# Patient Record
Sex: MALE | Race: WHITE | NOT HISPANIC OR LATINO | Employment: FULL TIME | URBAN - NONMETROPOLITAN AREA
[De-identification: names, ages, dates, MRNs, and addresses within clinical notes are randomized per-mention and may not be internally consistent; named-entity substitution may affect disease eponyms.]

---

## 2019-08-27 ENCOUNTER — OFFICE VISIT CONVERTED (OUTPATIENT)
Dept: SURGERY | Facility: CLINIC | Age: 59
End: 2019-08-27
Attending: SURGERY

## 2019-08-29 ENCOUNTER — CONVERSION ENCOUNTER (OUTPATIENT)
Dept: OTOLARYNGOLOGY | Facility: CLINIC | Age: 59
End: 2019-08-29

## 2019-08-29 ENCOUNTER — OFFICE VISIT CONVERTED (OUTPATIENT)
Dept: OTOLARYNGOLOGY | Facility: CLINIC | Age: 59
End: 2019-08-29
Attending: OTOLARYNGOLOGY

## 2019-10-18 ENCOUNTER — HOSPITAL ENCOUNTER (OUTPATIENT)
Dept: GASTROENTEROLOGY | Facility: HOSPITAL | Age: 59
Setting detail: HOSPITAL OUTPATIENT SURGERY
Discharge: HOME | End: 2019-10-18
Attending: SURGERY

## 2021-05-15 VITALS
WEIGHT: 249 LBS | HEART RATE: 68 BPM | SYSTOLIC BLOOD PRESSURE: 140 MMHG | HEIGHT: 68 IN | DIASTOLIC BLOOD PRESSURE: 70 MMHG | OXYGEN SATURATION: 96 % | BODY MASS INDEX: 37.74 KG/M2 | RESPIRATION RATE: 18 BRPM | TEMPERATURE: 97.8 F

## 2021-05-15 VITALS — RESPIRATION RATE: 16 BRPM | WEIGHT: 247.37 LBS | BODY MASS INDEX: 37.49 KG/M2 | HEIGHT: 68 IN

## 2022-12-01 ENCOUNTER — OFFICE VISIT (OUTPATIENT)
Dept: OTOLARYNGOLOGY | Facility: CLINIC | Age: 62
End: 2022-12-01

## 2022-12-01 VITALS — WEIGHT: 260.8 LBS | HEIGHT: 68 IN | BODY MASS INDEX: 39.53 KG/M2 | TEMPERATURE: 97.7 F

## 2022-12-01 DIAGNOSIS — H69.83 DYSFUNCTION OF BOTH EUSTACHIAN TUBES: ICD-10-CM

## 2022-12-01 DIAGNOSIS — H65.93 OTHER NONSUPPURATIVE OTITIS MEDIA OF BOTH EARS, UNSPECIFIED CHRONICITY: Primary | ICD-10-CM

## 2022-12-01 DIAGNOSIS — H90.6 MIXED CONDUCTIVE AND SENSORINEURAL HEARING LOSS OF BOTH EARS: ICD-10-CM

## 2022-12-01 PROCEDURE — 99204 OFFICE O/P NEW MOD 45 MIN: CPT | Performed by: OTOLARYNGOLOGY

## 2022-12-01 RX ORDER — PANTOPRAZOLE SODIUM 40 MG/1
TABLET, DELAYED RELEASE ORAL
COMMUNITY
Start: 2022-10-02

## 2022-12-01 RX ORDER — APIXABAN 5 MG/1
TABLET, FILM COATED ORAL
COMMUNITY
Start: 2022-11-27

## 2022-12-01 RX ORDER — MELOXICAM 15 MG/1
TABLET ORAL
COMMUNITY

## 2022-12-01 RX ORDER — METOPROLOL SUCCINATE 50 MG/1
TABLET, EXTENDED RELEASE ORAL
COMMUNITY
Start: 2022-11-27

## 2022-12-02 RX ORDER — CIPROFLOXACIN AND DEXAMETHASONE 3; 1 MG/ML; MG/ML
3 SUSPENSION/ DROPS AURICULAR (OTIC) 2 TIMES DAILY
Qty: 7.5 ML | Refills: 0 | Status: SHIPPED | OUTPATIENT
Start: 2022-12-02 | End: 2022-12-12

## 2022-12-06 PROCEDURE — 69433 CREATE EARDRUM OPENING: CPT | Performed by: OTOLARYNGOLOGY

## 2022-12-06 NOTE — PROGRESS NOTES
Patient Name: Jessa Benavidez   Visit Date: 12/01/2022   Patient ID: [id number]  Provider: Amarjit Pollack MD    Sex: male  [place] [facility]   YOB: 1960  Location Address: [address] [place] [address], [address],?[place]?[address]    Primary Care Provider Carlton Rodriguez Jr., MD  Location Phone: [phone]    Referring Provider: No ref. provider found        Chief Complaint  Hearing Loss/Audio results    Subjective    History of Present Illness  Jessa Benavidez is a 62 y.o. male who presents to [facility] today as a consult from No ref. provider found.    He presents to the clinic today for evaluation of eustachian tube dysfunction, mixed hearing loss, and possible otitis media.  He informs me that he has had progressively worsening hearing, and recently had an audiogram.  I reviewed the results with him which shows borderline normal to precipitously sloping to mild to severe mixed hearing loss in both ears, slightly worse on the left side.  There is a conductive component of at least 20 dB throughout.  Tympanograms reveal flat tympanometry with decreased eardrum mobility.  Word discrimination scores are 60% on the right and 80% on the left.  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    Past Medical History:   Diagnosis Date   • A-fib ([facility])        Past Surgical History:   Procedure Laterality Date   • BACK SURGERY     • REPLACEMENT TOTAL KNEE Bilateral    • WISDOM TOOTH EXTRACTION           Current Outpatient Medications:   •  Eliquis 5 MG tablet tablet, , Disp: , Rfl:   •  metoprolol succinate XL (TOPROL-XL) 50 MG 24 hr tablet, , Disp: , Rfl:   •  pantoprazole (PROTONIX) 40 MG EC tablet, TAKE 1 TABLET BY MOUTH EVERY DAY FOR STOMACH ACID, Disp: , Rfl:   •  ciprofloxacin-dexamethasone (CIPRODEX) 0.3-0.1 % otic suspension, Administer 3 drops into ear(s) as  "directed by provider 2 (Two) Times a Day for 10 days., Disp: 7.5 mL, Rfl: 0  •  meloxicam (MOBIC) 15 MG tablet, meloxicam 15 mg oral tablet take 1 tablet (15 mg) by oral route once daily   Suspended, Disp: , Rfl:      No Known Allergies    History reviewed. No pertinent family history.     Social History     Social History Narrative   • Not on file       Objective     Vital Signs:   Temp 97.7 °F (36.5 °C) (Temporal)   Ht 172.7 cm (68\")   Wt 118 kg (260 lb 12.8 oz)   BMI 39.65 kg/m²       Physical Exam    Left Myringotomy Tube Insertion    Date/Time: 12/6/2022 1:35 PM  Performed by: Amarjit Pollack MD  Authorized by: Amarjit Pollack MD         Indications:  Left myringotomy tube insertion was felt to be indicated for otitis media with effusion and eustachian tube dysfunction.     Procedure:  The ear canal and tympanic membrane were visualized with the otologic microscope. A left myringotomy tube insertion was performed. The middle ear was inspected and noted to have edema and serous effusion. After suctioning, a beveled Avila modified tube was then placed in the myringotomy site. Afterwards, the ear was treated with Ciprodex. The procedure was tolerated with difficulty.         Constitutional   Appearance  · : well developed, well-nourished, alert and in no acute distress, voice clear and strong    Head  Inspection  · : no deformities or lesions  Face  Inspection  · : No facial lesions; House-Brackmann I/VI bilaterally  Palpation  · : No TMJ crepitus nor  muscle tenderness bilaterally    Eyes  Vision  Visual Fields  · : Extraocular movements are intact. No spontaneous or gaze-induced nystagmus.  Conjunctivae  · : clear  Sclerae  · : clear  Pupils and Irises  · : pupils equal, round, and reactive to light.     Ears, Nose, Mouth and Throat    Ears    External Ears  · : appearance within normal limits, no lesions present  Otoscopic Examination  · : Tympanic membrane appearance dull bilaterally, " possible mucoid effusions, thickened tympanic membrane.  Attempts were made to place a right ear tube, but I was unable to use due to patient discomfort and thickened eardrum.  Left ear tube was placed with some difficulty.  Hearing  · : intact to conversational voice both ears  Tunning fork testing:     :    Nose    External Nose  · : appearance normal  Intranasal Exam  · : mucosa within normal limits, vestibules normal, no intranasal lesions present, septum midline, sinuses non tender to percussion  Oral Cavity    Oral Mucosa  · : oral mucosa normal without pallor or cyanosis  Lips  · : lip appearance normal  Teeth  · : normal dentition for age  Gums  · : gums pink, non-swollen, no bleeding present  Tongue  · : tongue appearance normal; normal mobility  Palate  · : hard palate normal, soft palate appearance normal with symmetric mobility    Throat    Oropharynx  · : no inflammation or lesions present, tonsils within normal limits  Hypopharynx  · : appearance within normal limits, superior epiglottis within normal limits  Larynx  · : appearance within normal limits, vocal cords within normal limits, no lesions present    Neck  Inspection/Palpation  · : normal appearance, no masses or tenderness, trachea midline; thyroid size normal, nontender, no nodules or masses present on palpation    Respiratory  Respiratory Effort  · : breathing unlabored  Inspection of Chest  · : normal appearance, no retractions    Cardiovascular  Heart  · : regular rate and rhythm    Lymphatic  Neck  · : no lymphadenopathy present  Supraclavicular Nodes  · : no lymphadenopathy present  Preauricular Nodes  · : no lymphadenopathy present    Skin and Subcutaneous Tissue  General Inspection  · : Regarding face and neck - there are no rashes present, no lesions present, and no areas of discoloration    Neurologic  Cranial Nerves  · : cranial nerves II-XII are grossly intact bilaterally  Gait and Station  · : normal gait, able to stand without  diffculty    Psychiatric  Judgement and Insight  · : judgment and insight intact  Mood and Affect  · : mood normal, affect appropriate          Assessment and Plan    Diagnoses and all orders for this visit:    1. Other nonsuppurative otitis media of both ears, unspecified chronicity (Primary)  -     ciprofloxacin-dexamethasone (CIPRODEX) 0.3-0.1 % otic suspension; Administer 3 drops into ear(s) as directed by provider 2 (Two) Times a Day for 10 days.  Dispense: 7.5 mL; Refill: 0    2. Dysfunction of both eustachian tubes    3. Mixed conductive and sensorineural hearing loss of both ears    Examination today revealed dull appearing eardrums on both sides.  Based on the results of the audiogram and appearance of his years, I did inform him that he may have fluid in the middle ear that is contributing to his hearing loss, or perhaps thickening of the eardrum.  I did recommend PE tube placement, but we were not able to place the tube on the right side due to thickened eardrum and patient discomfort.  I was able to place a tube on the left, which did have a serous effusion and mucosal inflammation as well as thickened eardrum.  I have started him on Ciprodex which I would like for him to use for about a week, and we will plan to see him back in the clinic in about 6 weeks.    Follow Up   No follow-ups on file.  Patient was given instructions and counseling regarding his condition or for health maintenance advice. Please see specific information pulled into the AVS if appropriate.

## 2023-01-19 ENCOUNTER — OFFICE VISIT (OUTPATIENT)
Dept: OTOLARYNGOLOGY | Facility: CLINIC | Age: 63
End: 2023-01-19
Payer: COMMERCIAL

## 2023-01-19 VITALS — WEIGHT: 258.8 LBS | BODY MASS INDEX: 39.22 KG/M2 | HEIGHT: 68 IN | TEMPERATURE: 97.5 F

## 2023-01-19 DIAGNOSIS — H90.3 SENSORINEURAL HEARING LOSS (SNHL) OF BOTH EARS: ICD-10-CM

## 2023-01-19 DIAGNOSIS — H69.83 DYSFUNCTION OF BOTH EUSTACHIAN TUBES: Primary | ICD-10-CM

## 2023-01-19 PROCEDURE — 99213 OFFICE O/P EST LOW 20 MIN: CPT | Performed by: OTOLARYNGOLOGY

## 2023-01-19 NOTE — PROGRESS NOTES
Patient Name: Jessa Benavidez   Visit Date: 01/19/2023   Patient ID: [id number]  Provider: Amarjit Pollack MD    Sex: male  Location: [facility] [place] [facility]   YOB: 1960  Location Address: [address] [place] [address], [address],?[place]?[address]    Primary Care Provider Carlton Rodriguez Jr., MD  Location Phone: [phone]    Referring Provider: No ref. provider found        Chief Complaint  Follow-up (Ear check)    Subjective    History of Present Illness  Jessa Benavidez is a 62 y.o. male who presents to [facility] today as a consult from No ref. provider found.    He presents to the clinic today for follow-up regarding his ears and hearing.  In the last clinic visit I was able to place an ear tube on the left side, but not on the right due to difficulty and discomfort.  His eardrums appeared inflamed bilaterally and there was thickened eardrums and fluid in the middle ear on the left side.  He informs me that he has done well and has not had any ear pain issues.  He has noted a significant improvement in the hearing on his left side.  He previously was supposed to have a hearing test but they were not able to perform the test due to the fluid and he was sent for me to evaluate him in early December.  He does use hearing aids, and has had significant improvement after PE tube placement.    Past Medical History:   Diagnosis Date   • A-fib ([facility])    • ETD (eustachian tube dysfunction)    • HL (hearing loss)    • Otitis media        Past Surgical History:   Procedure Laterality Date   • BACK SURGERY     • REPLACEMENT TOTAL KNEE Bilateral    • WISDOM TOOTH EXTRACTION           Current Outpatient Medications:   •  Eliquis 5 MG tablet tablet, , Disp: , Rfl:   •  meloxicam (MOBIC) 15 MG tablet, meloxicam 15 mg oral tablet take 1 tablet (15 mg) by oral route once daily   Suspended, Disp: , Rfl:   •  metoprolol succinate XL (TOPROL-XL) 50 MG 24 hr tablet, , Disp: , Rfl:  "  •  pantoprazole (PROTONIX) 40 MG EC tablet, TAKE 1 TABLET BY MOUTH EVERY DAY FOR STOMACH ACID, Disp: , Rfl:      No Known Allergies    Family History   Problem Relation Age of Onset   • Heart disease Mother    • Heart disease Father    • Cancer Father    • Alzheimer's disease Father    • Cancer Sister    • Cancer Maternal Uncle    • Cancer Paternal Aunt         Social History     Social History Narrative   • Not on file       Objective     Vital Signs:   Temp 97.5 °F (36.4 °C) (Tympanic)   Ht 172.7 cm (68\")   Wt 117 kg (258 lb 12.8 oz)   BMI 39.35 kg/m²       Physical Exam         Constitutional   Appearance  · : well developed, well-nourished, alert and in no acute distress, voice clear and strong    Head  Inspection  · : no deformities or lesions  Face  Inspection  · : No facial lesions; House-Brackmann I/VI bilaterally  Palpation  · : No TMJ crepitus nor  muscle tenderness bilaterally    Eyes  Vision  Visual Fields  · : Extraocular movements are intact. No spontaneous or gaze-induced nystagmus.  Conjunctivae  · : clear  Sclerae  · : clear  Pupils and Irises  · : pupils equal, round, and reactive to light.     Ears, Nose, Mouth and Throat    Ears    External Ears  · : appearance within normal limits, no lesions present  Otoscopic Examination  · : Tympanic membrane appearance thickened on the right, well-healed tympanic membrane.  Left ear with patent functioning PE tube, improvement in the thickening of the eardrum.  Hearing  · : intact to conversational voice both ears  Tunning fork testing:     :    Nose    External Nose  · : appearance normal  Intranasal Exam  · : mucosa within normal limits, vestibules normal, no intranasal lesions present, septum midline, sinuses non tender to percussion  Oral Cavity    Oral Mucosa  · : oral mucosa normal without pallor or cyanosis  Lips  · : lip appearance normal  Teeth  · : normal dentition for age  Gums  · : gums pink, non-swollen, no bleeding " present  Tongue  · : tongue appearance normal; normal mobility  Palate  · : hard palate normal, soft palate appearance normal with symmetric mobility    Throat    Oropharynx  · : no inflammation or lesions present, tonsils within normal limits  Hypopharynx  · : appearance within normal limits, superior epiglottis within normal limits  Larynx  · : appearance within normal limits, vocal cords within normal limits, no lesions present    Neck  Inspection/Palpation  · : normal appearance, no masses or tenderness, trachea midline; thyroid size normal, nontender, no nodules or masses present on palpation    Respiratory  Respiratory Effort  · : breathing unlabored  Inspection of Chest  · : normal appearance, no retractions    Cardiovascular  Heart  · : regular rate and rhythm    Lymphatic  Neck  · : no lymphadenopathy present  Supraclavicular Nodes  · : no lymphadenopathy present  Preauricular Nodes  · : no lymphadenopathy present    Skin and Subcutaneous Tissue  General Inspection  · : Regarding face and neck - there are no rashes present, no lesions present, and no areas of discoloration    Neurologic  Cranial Nerves  · : cranial nerves II-XII are grossly intact bilaterally  Gait and Station  · : normal gait, able to stand without diffculty    Psychiatric  Judgement and Insight  · : judgment and insight intact  Mood and Affect  · : mood normal, affect appropriate          Assessment and Plan    Diagnoses and all orders for this visit:    1. Dysfunction of both eustachian tubes (Primary)  -     Comprehensive Hearing Test; Future  -     Tympanometry; Future    2. Sensorineural hearing loss (SNHL) of both ears  -     Comprehensive Hearing Test; Future  -     Tympanometry; Future    Examination today revealed significant improvement in his left eardrum which previously appeared thickened.  His PE tube is patent and functioning.  Right ear appears stable and does have thickened tympanic membrane and possible fluid in the  middle ear.  I discussed the findings with him.  I will plan on obtaining an audiogram and tympanogram and we will see him back afterwards to discuss the results and any further management.    Follow Up   No follow-ups on file.  Patient was given instructions and counseling regarding his condition or for health maintenance advice. Please see specific information pulled into the AVS if appropriate.

## 2023-03-08 ENCOUNTER — OFFICE VISIT (OUTPATIENT)
Dept: OTOLARYNGOLOGY | Facility: CLINIC | Age: 63
End: 2023-03-08
Payer: COMMERCIAL

## 2023-03-08 VITALS — BODY MASS INDEX: 39.07 KG/M2 | TEMPERATURE: 98.2 F | WEIGHT: 257.8 LBS | HEIGHT: 68 IN

## 2023-03-08 DIAGNOSIS — H69.82 DYSFUNCTION OF LEFT EUSTACHIAN TUBE: ICD-10-CM

## 2023-03-08 DIAGNOSIS — H90.3 SENSORINEURAL HEARING LOSS (SNHL) OF BOTH EARS: Primary | ICD-10-CM

## 2023-03-08 PROBLEM — H69.92 DYSFUNCTION OF LEFT EUSTACHIAN TUBE: Status: ACTIVE | Noted: 2023-03-08

## 2023-03-08 PROCEDURE — 99213 OFFICE O/P EST LOW 20 MIN: CPT | Performed by: OTOLARYNGOLOGY

## 2023-03-08 NOTE — PROGRESS NOTES
Patient Name: Jessa Benavidez   Visit Date: 03/08/2023   Patient ID: [id number]  Provider: Amarjit Pollack MD    Sex: male  [place] [facility]   YOB: 1960  Location Address: [address] [place] [address], [address],?[place]?[address]    Primary Care Provider Carlton Rodriguez Jr., MD  Location Phone: [phone]    Referring Provider: No ref. provider found        Chief Complaint  Follow-up (With Audio results)    Subjective    History of Present Illness  Jessa Benavidez is a 62 y.o. male who presents to [facility] today as a consult from No ref. provider found.    He presents to the clinic today for follow-up regarding his ears and hearing.  I last saw him for this in January, at which point his left ear was doing much better with a PE tube placed in the eardrum looked less thickened.  He did have a recent audiogram and had his hearing aids adjusted.  This shows borderline normal hearing sloping to mild to severe mixed hearing loss in the right ear and mild to profound mixed hearing loss in the left ear.  Results were discussed with the patient.  He notes that he is doing very well with hearing aids.  He has had no drainage or ear complaints.    Past Medical History:   Diagnosis Date   • A-fib ([facility])    • ETD (eustachian tube dysfunction)    • HL (hearing loss)    • Otitis media        Past Surgical History:   Procedure Laterality Date   • BACK SURGERY     • REPLACEMENT TOTAL KNEE Bilateral    • WISDOM TOOTH EXTRACTION           Current Outpatient Medications:   •  Eliquis 5 MG tablet tablet, , Disp: , Rfl:   •  meloxicam (MOBIC) 15 MG tablet, meloxicam 15 mg oral tablet take 1 tablet (15 mg) by oral route once daily   Suspended, Disp: , Rfl:   •  metoprolol succinate XL (TOPROL-XL) 50 MG 24 hr tablet, , Disp: , Rfl:   •  pantoprazole (PROTONIX) 40 MG EC tablet, TAKE 1 TABLET BY MOUTH EVERY DAY FOR STOMACH ACID, Disp: , Rfl:      No Known  "Allergies    Family History   Problem Relation Age of Onset   • Heart disease Mother    • Heart disease Father    • Cancer Father    • Alzheimer's disease Father    • Cancer Sister    • Cancer Maternal Uncle    • Cancer Paternal Aunt         Social History     Social History Narrative   • Not on file       Objective     Vital Signs:   Temp 98.2 °F (36.8 °C) (Tympanic)   Ht 172.7 cm (68\")   Wt 117 kg (257 lb 12.8 oz)   BMI 39.20 kg/m²       Physical Exam         Constitutional   Appearance  · : well developed, well-nourished, alert and in no acute distress, voice clear and strong    Head  Inspection  · : no deformities or lesions  Face  Inspection  · : No facial lesions; House-Brackmann I/VI bilaterally  Palpation  · : No TMJ crepitus nor  muscle tenderness bilaterally    Eyes  Vision  Visual Fields  · : Extraocular movements are intact. No spontaneous or gaze-induced nystagmus.  Conjunctivae  · : clear  Sclerae  · : clear  Pupils and Irises  · : pupils equal, round, and reactive to light.     Ears, Nose, Mouth and Throat    Ears    External Ears  · : appearance within normal limits, no lesions present  Otoscopic Examination  · : Tympanic membrane appearance within normal limits bilaterally, left PE tube patent and functioning, well-aerated middle ears  Hearing  · : intact to conversational voice both ears  Tunning fork testing:     :    Nose    External Nose  · : appearance normal  Intranasal Exam  · : mucosa within normal limits, vestibules normal, no intranasal lesions present, septum midline, sinuses non tender to percussion  Oral Cavity    Oral Mucosa  · : oral mucosa normal without pallor or cyanosis  Lips  · : lip appearance normal  Teeth  · : normal dentition for age  Gums  · : gums pink, non-swollen, no bleeding present  Tongue  · : tongue appearance normal; normal mobility  Palate  · : hard palate normal, soft palate appearance normal with symmetric mobility    Throat    Oropharynx  · : no " inflammation or lesions present, tonsils within normal limits  Hypopharynx  · : appearance within normal limits, superior epiglottis within normal limits  Larynx  · : appearance within normal limits, vocal cords within normal limits, no lesions present    Neck  Inspection/Palpation  · : normal appearance, no masses or tenderness, trachea midline; thyroid size normal, nontender, no nodules or masses present on palpation    Respiratory  Respiratory Effort  · : breathing unlabored  Inspection of Chest  · : normal appearance, no retractions    Cardiovascular  Heart  · : regular rate and rhythm    Lymphatic  Neck  · : no lymphadenopathy present  Supraclavicular Nodes  · : no lymphadenopathy present  Preauricular Nodes  · : no lymphadenopathy present    Skin and Subcutaneous Tissue  General Inspection  · : Regarding face and neck - there are no rashes present, no lesions present, and no areas of discoloration    Neurologic  Cranial Nerves  · : cranial nerves II-XII are grossly intact bilaterally  Gait and Station  · : normal gait, able to stand without diffculty    Psychiatric  Judgement and Insight  · : judgment and insight intact  Mood and Affect  · : mood normal, affect appropriate          Assessment and Plan    Diagnoses and all orders for this visit:    1. Sensorineural hearing loss (SNHL) of both ears (Primary)    2. Dysfunction of left eustachian tube    Examination today revealed his left PE tube to be patent and functioning.  Hears a clear of any fluid or infection.  Hearing test were discussed with the patient, and I think he is a good candidate with hearing aids.  He does have a mild conductive hearing loss, but I do not think this is worth pursuing surgically as he does have some issues such as thickened eardrum and previous chronic ear disease that may explain the conductive loss.  This was discussed with the patient, and he is in agreement with the plan.  I will be glad to see him back on an as-needed basis  should there be any further issues.    Follow Up   No follow-ups on file.  Patient was given instructions and counseling regarding his condition or for health maintenance advice. Please see specific information pulled into the AVS if appropriate.

## 2023-04-05 ENCOUNTER — OFFICE VISIT (OUTPATIENT)
Dept: OTOLARYNGOLOGY | Facility: CLINIC | Age: 63
End: 2023-04-05
Payer: COMMERCIAL

## 2023-04-05 VITALS — TEMPERATURE: 97.8 F | HEIGHT: 68 IN | BODY MASS INDEX: 38.74 KG/M2 | WEIGHT: 255.6 LBS

## 2023-04-05 DIAGNOSIS — H90.3 SENSORINEURAL HEARING LOSS (SNHL) OF BOTH EARS: ICD-10-CM

## 2023-04-05 DIAGNOSIS — H61.22 IMPACTED CERUMEN OF LEFT EAR: ICD-10-CM

## 2023-04-05 DIAGNOSIS — H69.82 DYSFUNCTION OF LEFT EUSTACHIAN TUBE: Primary | ICD-10-CM

## 2023-04-05 DIAGNOSIS — H65.492 OTHER CHRONIC NONSUPPURATIVE OTITIS MEDIA OF LEFT EAR: ICD-10-CM

## 2023-04-05 PROBLEM — H66.90 CHRONIC OTITIS MEDIA: Status: ACTIVE | Noted: 2023-04-05

## 2023-04-05 PROCEDURE — 69210 REMOVE IMPACTED EAR WAX UNI: CPT | Performed by: OTOLARYNGOLOGY

## 2023-04-05 PROCEDURE — 99214 OFFICE O/P EST MOD 30 MIN: CPT | Performed by: OTOLARYNGOLOGY

## 2023-04-05 RX ORDER — CIPROFLOXACIN AND DEXAMETHASONE 3; 1 MG/ML; MG/ML
3 SUSPENSION/ DROPS AURICULAR (OTIC) 2 TIMES DAILY
Qty: 7.5 ML | Refills: 0 | Status: SHIPPED | OUTPATIENT
Start: 2023-04-05 | End: 2023-04-15

## 2023-04-05 NOTE — PROGRESS NOTES
Patient Name: Jessa Benavidez   Visit Date: 04/05/2023   Patient ID: [id number]  Provider: Amarjit Pollack MD    Sex: male  Location: [facility]   YOB: 1960  Location Address: [address] [place] [address], [address],?[place]?[id number]    Primary Care Provider Carlton Rodriguez Jr., MD  Location Phone: [phone]    Referring Provider: No ref. provider found        Chief Complaint  Follow-up (Blood in ear )    Subjective    History of Present Illness  Jessa Benavidez is a 62 y.o. male who presents to [facility] today as a consult from No ref. provider found.    He presents to the clinic today for evaluation of bleeding out of the left ear several weeks ago.  He is on blood thinners and previously had a PE tube placed in January for thickened eardrums and otitis media.  He was doing well until the bleeding episode started.  He has noted no significant change to his hearing, but does generally have very poor hearing especially on his left side.  Thickened eardrums were thought to be due to either fungal infection or chronic otitis.  He was on Ciprodex drops previously which did help.    Past Medical History:   Diagnosis Date   • A-fib    • ETD (eustachian tube dysfunction)    • HL (hearing loss)    • Otitis media        Past Surgical History:   Procedure Laterality Date   • BACK SURGERY     • REPLACEMENT TOTAL KNEE Bilateral    • WISDOM TOOTH EXTRACTION           Current Outpatient Medications:   •  Eliquis 5 MG tablet tablet, , Disp: , Rfl:   •  meloxicam (MOBIC) 15 MG tablet, meloxicam 15 mg oral tablet take 1 tablet (15 mg) by oral route once daily   Suspended, Disp: , Rfl:   •  metoprolol succinate XL (TOPROL-XL) 50 MG 24 hr tablet, , Disp: , Rfl:   •  pantoprazole (PROTONIX) 40 MG EC tablet, TAKE 1 TABLET BY MOUTH EVERY DAY FOR STOMACH ACID, Disp: , Rfl:   •  ciprofloxacin-dexamethasone (CIPRODEX) 0.3-0.1 % otic suspension, Administer 3 drops into the  "left ear 2 (Two) Times a Day for 10 days., Disp: 7.5 mL, Rfl: 0     No Known Allergies    Family History   Problem Relation Age of Onset   • Heart disease Mother    • Heart disease Father    • Cancer Father    • Alzheimer's disease Father    • Cancer Sister    • Cancer Maternal Uncle    • Cancer Paternal Aunt         Social History     Social History Narrative   • Not on file       Objective     Vital Signs:   Temp 97.8 °F (36.6 °C) (Tympanic)   Ht 172.7 cm (68\")   Wt 116 kg (255 lb 9.6 oz)   BMI 38.86 kg/m²       Physical Exam    Ear Cerumen Removal    Date/Time: 4/5/2023 2:13 PM  Performed by: Amarjit Pollack MD  Authorized by: Amarjit Pollack MD     Anesthesia:  Local Anesthetic: none  Location details: left ear  Procedure type: instrumentation, suction   Sedation:  Patient sedated: no            Constitutional   Appearance  · : well developed, well-nourished, alert and in no acute distress, voice clear and strong    Head  Inspection  · : no deformities or lesions  Face  Inspection  · : No facial lesions; House-Brackmann I/VI bilaterally  Palpation  · : No TMJ crepitus nor  muscle tenderness bilaterally    Eyes  Vision  Visual Fields  · : Extraocular movements are intact. No spontaneous or gaze-induced nystagmus.  Conjunctivae  · : clear  Sclerae  · : clear  Pupils and Irises  · : pupils equal, round, and reactive to light.     Ears, Nose, Mouth and Throat    Ears    External Ears  · : appearance within normal limits, no lesions present  Otoscopic Examination  · : Tympanic membrane appearance thickened bilaterally, left PE tube patent but there is granulation tissue along the middle ear aspect.  Ciprodex drops were infused.  Hearing  · : intact to conversational voice both ears  Tunning fork testing:     :    Nose    External Nose  · : appearance normal  Intranasal Exam  · : mucosa within normal limits, vestibules normal, no intranasal lesions present, septum midline, sinuses non tender to " percussion  Oral Cavity    Oral Mucosa  · : oral mucosa normal without pallor or cyanosis  Lips  · : lip appearance normal  Teeth  · : normal dentition for age  Gums  · : gums pink, non-swollen, no bleeding present  Tongue  · : tongue appearance normal; normal mobility  Palate  · : hard palate normal, soft palate appearance normal with symmetric mobility    Throat    Oropharynx  · : no inflammation or lesions present, tonsils within normal limits  Hypopharynx  · : appearance within normal limits, superior epiglottis within normal limits  Larynx  · : appearance within normal limits, vocal cords within normal limits, no lesions present    Neck  Inspection/Palpation  · : normal appearance, no masses or tenderness, trachea midline; thyroid size normal, nontender, no nodules or masses present on palpation    Respiratory  Respiratory Effort  · : breathing unlabored  Inspection of Chest  · : normal appearance, no retractions    Cardiovascular  Heart  · : regular rate and rhythm    Lymphatic  Neck  · : no lymphadenopathy present  Supraclavicular Nodes  · : no lymphadenopathy present  Preauricular Nodes  · : no lymphadenopathy present    Skin and Subcutaneous Tissue  General Inspection  · : Regarding face and neck - there are no rashes present, no lesions present, and no areas of discoloration    Neurologic  Cranial Nerves  · : cranial nerves II-XII are grossly intact bilaterally  Gait and Station  · : normal gait, able to stand without diffculty    Psychiatric  Judgement and Insight  · : judgment and insight intact  Mood and Affect  · : mood normal, affect appropriate          Assessment and Plan    Diagnoses and all orders for this visit:    1. Dysfunction of left eustachian tube (Primary)  -     ciprofloxacin-dexamethasone (CIPRODEX) 0.3-0.1 % otic suspension; Administer 3 drops into the left ear 2 (Two) Times a Day for 10 days.  Dispense: 7.5 mL; Refill: 0    2. Sensorineural hearing loss (SNHL) of both ears    3. Other  chronic nonsuppurative otitis media of left ear  -     ciprofloxacin-dexamethasone (CIPRODEX) 0.3-0.1 % otic suspension; Administer 3 drops into the left ear 2 (Two) Times a Day for 10 days.  Dispense: 7.5 mL; Refill: 0    4. Impacted cerumen of left ear    Other orders  -     Cerumen Removal    Examination today revealed thickened eardrums bilaterally and patent functional left PE tube.  There is granulation tissue medial to the PE tube in the middle ear and this was likely the source of the bleeding.  I infuse Ciprodex drops and will have him continue to use the drops for a period of 10 days.  I would like to see him back in the clinic shortly in about 4 to 6 weeks to reassess the ear.  I am hopeful the granulation tissue resolves.  We did discuss the fact that his P2 may extrude early due to the granulation tissue and he understands.    Follow Up   No follow-ups on file.  Patient was given instructions and counseling regarding his condition or for health maintenance advice. Please see specific information pulled into the AVS if appropriate.

## 2023-07-20 ENCOUNTER — TRANSCRIBE ORDERS (OUTPATIENT)
Dept: ADMINISTRATIVE | Facility: HOSPITAL | Age: 63
End: 2023-07-20
Payer: COMMERCIAL

## 2023-07-20 DIAGNOSIS — R10.2 PELVIC PAIN: ICD-10-CM

## 2023-07-20 DIAGNOSIS — R19.05 PERIUMBILIC SWELLING, MASS OR LUMP: Primary | ICD-10-CM

## 2024-09-23 ENCOUNTER — TELEPHONE (OUTPATIENT)
Dept: SURGERY | Facility: CLINIC | Age: 64
End: 2024-09-23
Payer: COMMERCIAL